# Patient Record
Sex: MALE | Race: WHITE
[De-identification: names, ages, dates, MRNs, and addresses within clinical notes are randomized per-mention and may not be internally consistent; named-entity substitution may affect disease eponyms.]

---

## 2020-03-24 ENCOUNTER — HOSPITAL ENCOUNTER (EMERGENCY)
Dept: HOSPITAL 41 - JD.ED | Age: 45
Discharge: HOME | End: 2020-03-24
Payer: COMMERCIAL
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
## 2020-03-24 DIAGNOSIS — S61.412A: Primary | ICD-10-CM
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
## 2020-03-24 DIAGNOSIS — Z91.030: ICD-10-CM

## 2020-03-24 PROCEDURE — 96375 TX/PRO/DX INJ NEW DRUG ADDON: CPT

## 2020-03-24 PROCEDURE — 73130 X-RAY EXAM OF HAND: CPT

## 2020-03-24 PROCEDURE — 99283 EMERGENCY DEPT VISIT LOW MDM: CPT

## 2020-03-24 PROCEDURE — 12002 RPR S/N/AX/GEN/TRNK2.6-7.5CM: CPT

## 2020-03-24 PROCEDURE — 96376 TX/PRO/DX INJ SAME DRUG ADON: CPT

## 2020-03-24 PROCEDURE — 96365 THER/PROPH/DIAG IV INF INIT: CPT

## 2020-03-24 NOTE — CR
Left hand: 4 views of the left hand were obtained.

 

Comparison: No previous hand study.

 

Study slightly limited due to finger flexion.

 

Joint spaces are preserved.  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  No acute fracture or 

other acute abnormality is appreciated.

 

Impression:

1.  Findings as noted above. 

2.  No definite acute bony abnormality is appreciated on this somewhat

 limited study.

 

Diagnostic code #2

 

This report was dictated in MDT

## 2020-03-24 NOTE — EDM.PDOC
ED HPI GENERAL MEDICAL PROBLEM





- General


Chief Complaint: Laceration


Stated Complaint: LEFT PALM LACERATION


Time Seen by Provider: 03/24/20 10:15


Source of Information: Reports: Patient, RN Notes Reviewed





- History of Present Illness


INITIAL COMMENTS - FREE TEXT/NARRATIVE: 





44-year-old male suffered laceration injury to left hand not too long ago.  He 

was working with a piece of equipment at a [employer] shop in [place],  some how his 

hand got "crushed"  in the mechanics of the machine with resultant laceration 

to palmar aspect left hand.  He states there was a lot of bleeding that has 

been controlled with pressure. Has had tetanus immunization within the past 

year.  No apparent numbness, tingling or focal weakness of distal fingers.


  ** Left Hand


Pain Score (Numeric/FACES): 10





- Related Data


 Allergies











Allergy/AdvReac Type Severity Reaction Status Date / Time


 


bee venom protein (honey bee) Allergy  Swelling Verified 03/24/20 10:01











Home Meds: 


 Home Meds





Acetaminophen/HYDROcodone [Norco 325-5 MG] 1 tab PO Q6H PRN #14 tablet 03/24/20 

[Rx]


Cephalexin [Keflex] 500 mg PO QID #20 capsule 03/24/20 [Rx]











Past Medical History





- Past Health History


Medical/Surgical History: Denies Medical/Surgical History





Social & Family History





- Tobacco Use


Smoking Status *Q: Never Smoker





- Caffeine Use


Caffeine Use: Reports: Coffee





- Recreational Drug Use


Recreational Drug Use: No





ED ROS GENERAL





- Review of Systems


Review Of Systems: See Below


Constitutional: Reports: No Symptoms


HEENT: Reports: No Symptoms


Respiratory: Reports: No Symptoms


Cardiovascular: Reports: No Symptoms


GI/Abdominal: Reports: No Symptoms


Musculoskeletal: Reports: Other (Large deep laceration palmar aspect left hand)


Neurological: Denies: Numbness, Tingling, Weakness





ED EXAM, SKIN/RASH


Exam: See Below


General Appearance: Alert, Anxious, Moderate Distress


Head: Atraumatic


Neck: Supple


Respiratory/Chest: No Respiratory Distress


Extremities: Normal Range of Motion (Patient is holding fingers somewhat in 

flexion because that is his most comfortable position.  Can extend and he can 

flex all fingers), Other (Centimeter laceration mid palmar aspect of left hand 

on the ulnar side, moderately deep, no foreign material visible,  no deep 

tendon structures visible)


Neurological: No Motor/Sensory Deficits (There is no focal weakness of any of 

the fingers and distal sensation to touch is intact all fingers)





ED SKIN PROCEDURES





- Laceration/Wound Repair


  ** Left Hand


Appearance: Linear, Irregular, Other (Oertli deep, no foreign material seen)


Distal NVT: Neuro & Vascular Intact


Anesthetic Type: Local


Local Anesthesia - Lidocaine (Xylocaine): 1% Plain


Exploration/Debridement/Repair: Wound Explored, Minimal Debridement


Lac/Wound length In cm: 6


Suture Size: 3-0


# of Sutures: 16


Suture Type: Nylon





Course





- Vital Signs


Last Recorded V/S: 


 Last Vital Signs











Temp  97.3 F   03/24/20 09:57


 


Pulse  69   03/24/20 09:57


 


Resp  16   03/24/20 09:57


 


BP  158/100 H  03/24/20 09:57


 


Pulse Ox  96   03/24/20 09:57














- Orders/Labs/Meds


Orders: 


 Active Orders 24 hr











 Category Date Time Status


 


 Peripheral IV Care [RC] .AS DIRECTED Care  03/24/20 10:13 Active


 


 Peripheral IV Insertion Adult [OM.PC] Stat Oth  03/24/20 10:12 Ordered











Meds: 


Medications














Discontinued Medications














Generic Name Dose Route Start Last Admin





  Trade Name Freq  PRN Reason Stop Dose Admin


 


Hydromorphone HCl  0.5 mg  03/24/20 10:12  03/24/20 10:22





  Dilaudid  IVPUSH  03/24/20 10:13  0.5 mg





  ONETIME ONE   Administration





     





     





     





     


 


Hydromorphone HCl  0.5 mg  03/24/20 11:16  03/24/20 11:22





  Dilaudid  IVPUSH  03/24/20 11:17  0.5 mg





  ONETIME ONE   Administration





     





     





     





     


 


Hydromorphone HCl  0.5 mg  03/24/20 11:45  03/24/20 11:50





  Dilaudid  IVPUSH  03/24/20 11:46  0.5 mg





  ONETIME ONE   Administration





     





     





     





     


 


Cefazolin Sodium/Dextrose 2 gm  50 mls @ 100 mls/hr  03/24/20 11:55  03/24/20 12

:01





  / Premix  IV  03/24/20 12:24  100 mls/hr





  ONETIME ONE   Administration





     





     





     





     


 


Lidocaine HCl  50 ml  03/24/20 11:16  03/24/20 11:22





  Xylocaine 1%  INJECT  03/24/20 11:17  50 ml





  ONETIME ONE   Administration





     





     





     





     


 


Lorazepam  0.5 mg  03/24/20 11:45  03/24/20 11:50





  Ativan  IVPUSH  03/24/20 11:46  0.5 mg





  ONETIME ONE   Administration





     





     





     





     


 


Ondansetron HCl  4 mg  03/24/20 10:12  03/24/20 10:22





  Zofran  IVPUSH  03/24/20 10:13  4 mg





  ONETIME ONE   Administration





     





     





     





     


 


Sodium Chloride  10 ml  03/24/20 10:12  03/24/20 10:29





  Saline Flush  FLUSH   10 ml





  ASDIRECTED PRN   Administration





  Keep Vein Open   





     





     





     














- Re-Assessments/Exams


Free Text/Narrative Re-Assessment/Exam: 





03/24/20 14:05


X-rays of hand, no acute fracture he was given Ancef 2 g IV while here in the ED

, he was also given Dilaudid and Ativan IV.  Patient tolerated procedure 

relatively well.  Discharge instr. as documented. 





Departure





- Departure


Time of Disposition: 12:30


Disposition: Home, Self-Care 01


Condition: Fair


Clinical Impression: 


Laceration of hand


Qualifiers:


 Encounter type: initial encounter Foreign body presence: without foreign body 

Laterality: left Qualified Code(s): S61.412A - Laceration without foreign body 

of left hand, initial encounter








- Discharge Information


Prescriptions: 


Acetaminophen/HYDROcodone [Norco 325-5 MG] 1 tab PO Q6H PRN #14 tablet


 PRN Reason: Pain


Cephalexin [Keflex] 500 mg PO QID #20 capsule


Instructions:  Laceration Care, Adult


Referrals: 


PCP,None [Primary Care Provider] - 


Forms:  ED Department Discharge, ED Return to Work/School Form


Additional Instructions: 


laceration care instructions.  Leave protective dressing on hand until your 

medical recheck on Thursday.  Keep dressing dry and clean.  Elevate hand as 

much as possible.


I will every 6 to 8 hours for mild to moderate pain or hydrocodone if needed 

for more severe gaye codon at the same time.  Do not drive when taking 

hydrocodone.  Cephalexin antibiotic 500 mg 4 times daily.  Have this rechecked 

at our [facility] medical clinic Thursday 2 days from now.   Call [phone] for 

appointment.  If for some reason unable to be seen at our [facility] medical clinic 

return to our ED for recheck. 





Sepsis Event Note





- Evaluation


Sepsis Screening Result: No Definite Risk





- Focused Exam


Vital Signs: 


 Vital Signs











  Temp Pulse Resp BP Pulse Ox


 


 03/24/20 09:57  97.3 F  69  16  158/100 H  96











Date Exam was Performed: 03/24/20


Time Exam was Performed: 13:58





- My Orders


Last 24 Hours: 


My Active Orders





03/24/20 10:12


Peripheral IV Insertion Adult [OM.PC] Stat 





03/24/20 10:13


Peripheral IV Care [RC] .AS DIRECTED 














- Assessment/Plan


Last 24 Hours: 


My Active Orders





03/24/20 10:12


Peripheral IV Insertion Adult [OM.PC] Stat 





03/24/20 10:13


Peripheral IV Care [RC] .AS DIRECTED

## 2020-04-01 ENCOUNTER — HOSPITAL ENCOUNTER (EMERGENCY)
Dept: HOSPITAL 41 - JD.ED | Age: 45
Discharge: HOME | End: 2020-04-01
Payer: COMMERCIAL
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
## 2020-04-01 DIAGNOSIS — S61.412A: ICD-10-CM

## 2020-04-01 DIAGNOSIS — S67.22XA: Primary | ICD-10-CM

## 2020-04-01 NOTE — EDM.PDOC
ED HPI GENERAL MEDICAL PROBLEM





- General


Chief Complaint: Upper Extremity Injury/Pain


Stated Complaint: NUMBNESS IN LEFT HAND POST INJURY


Time Seen by Provider: 04/01/20 10:29





- History of Present Illness


INITIAL COMMENTS - FREE TEXT/NARRATIVE: 





44-year-old male presents the emergency room with numbness in his left hand.





Patient was seen here last week with a laceration over the distal palmar crease 

of his left hand following a crush type injury that he did at work.  Since that 

time the patient has developed generalized numbness in his fingers especially 

the pinky ring and to a lesser degree the middle finger.  This seems to involve 

both the dorsum and flexor surfaces of the fingers.  The patient has been back 

to work and has been doing quite a bit of work with his hand.  At night he has 

a hard time keeping the hand in a comfortable position.  Patient is using 

ibuprofen for pain and discomfort and this does help some


  ** Left Hand


Pain Score (Numeric/FACES): 9





- Related Data


 Allergies











Allergy/AdvReac Type Severity Reaction Status Date / Time


 


bee venom protein (honey bee) Allergy  Swelling Verified 04/01/20 10:34











Home Meds: 


 Home Meds





Acetaminophen/HYDROcodone [Norco 325-5 MG] 1 tab PO Q6H PRN #14 tablet 03/24/20 

[Rx]


Cephalexin [Keflex] 500 mg PO QID #20 capsule 03/24/20 [Rx]











Past Medical History





- Past Health History


Medical/Surgical History: Denies Medical/Surgical History





Social & Family History





- Caffeine Use


Caffeine Use: Reports: Coffee





Review of Systems





- Review of Systems


Review Of Systems: See Below


Constitutional: Reports: No Symptoms


Respiratory: Reports: No Symptoms


Cardiovascular: Reports: No Symptoms


GI/Abdominal: Reports: No Symptoms





ED EXAM, GENERAL





- Physical Exam


Exam: See Below


Exam Limited By: No Limitations


General Appearance: Alert, No Apparent Distress


Respiratory/Chest: No Respiratory Distress, Lungs Clear, Normal Breath Sounds


Cardiovascular: Regular Rate, Rhythm, No Edema, No Murmur


Extremities: Other (Patient is left hand shows intact vascular status good 

capillary refill.  He has circumferential numbness involving the pinky ring and 

to a lesser degree the middle finger.  Also of concern is him having a hard 

time fully assessing his flexion and extension probably due to the swelling in 

his has some ecchymosis developing over the dorsum of his hand with noticeable 

swelling over the dorsum of his hand as the palmar aspect which is more 

noticeable than the dorsal aspect.)





Course





- Vital Signs


Last Recorded V/S: 


 Last Vital Signs











Temp  36.8 C   04/01/20 10:28


 


Pulse  90   04/01/20 10:28


 


Resp  16   04/01/20 10:28


 


BP  152/100 H  04/01/20 10:28


 


Pulse Ox  96   04/01/20 10:28














- Re-Assessments/Exams


Free Text/Narrative Re-Assessment/Exam: 





04/01/20 11:29


This unfortunate gentleman has what I believe is neuropraxia because of the 

crush injuries involving the nerves in his hand and the resultant swelling on 

both the dorsum and palmar surfaces of his hand.  Vascularly he is intact.  I 

have some concerns from reviewing his initial note immediately after the injury 

neurovascularly he was intact he had limited tendon function he could flex and 

extend his digits but this cannot be done fully thought to be secondary to the 

injury.  At this time I cannot get him to fully extend in part due to the 

swelling he has and he cannot fully flex.  I have informed the patient in 

absolutely no uncertain terms that this needs to be followed very closely until 

he has normal function established.  Also he is at work and his employer is 

pushing him to do more with his left hand that he should be doing at this point 

with the swelling and there he needs to be doing limited duty with this hand.  

This will be included in his discharge instructions I believe in his best 

interest he should follow-up with the L&I doctor early next week.  He is using 

ibuprofen he is advised to continue with this as well.











Departure





- Departure


Time of Disposition: 11:32


Disposition: Home, Self-Care 01


Clinical Impression: 


 Crushing injury of left hand





Laceration of hand


Qualifiers:


 Encounter type: initial encounter Foreign body presence: without foreign body 

Laterality: left Qualified Code(s): S61.412A - Laceration without foreign body 

of left hand, initial encounter








- Discharge Information


Instructions:  Laceration Care, Adult


Referrals: 


PCP,None [Primary Care Provider] - 


Forms:  ED Department Discharge, ED Return to Work/School Form


Additional Instructions: 


Return to the emergency room with any questions problems or worsening symptoms.

  





Keep your hand elevated as much as you can.  I cannot emphasize this enough.  





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  Watch for return 

of nerve function.  Also your flexion and extension, the ability to move your 

fingers fully needs to be monitored until full function is established.





Sepsis Event Note





- Focused Exam


Vital Signs: 


 Vital Signs











  Temp Pulse Resp BP Pulse Ox


 


 04/01/20 10:28  36.8 C  90  16  152/100 H  96











Date Exam was Performed: 04/01/20


Time Exam was Performed: 11:51

## 2020-04-06 ENCOUNTER — HOSPITAL ENCOUNTER (EMERGENCY)
Dept: HOSPITAL 41 - JD.ED | Age: 45
End: 2020-04-06
Payer: COMMERCIAL

## 2020-04-12 ENCOUNTER — HOSPITAL ENCOUNTER (EMERGENCY)
Dept: HOSPITAL 41 - JD.ED | Age: 45
Discharge: HOME | End: 2020-04-12
Payer: COMMERCIAL

## 2020-04-12 DIAGNOSIS — Z91.030: ICD-10-CM
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
## 2020-04-12 DIAGNOSIS — F17.210: ICD-10-CM

## 2020-04-12 DIAGNOSIS — L08.9: ICD-10-CM

## 2020-04-12 DIAGNOSIS — S61.412A: Primary | ICD-10-CM

## 2020-04-12 NOTE — EDM.PDOC
ED HPI GENERAL MEDICAL PROBLEM





- General


Chief Complaint: Upper Extremity Injury/Pain


Stated Complaint: LEFT HAND PAIN (ODOR FROM INJURY)


Time Seen by Provider: 04/12/20 14:26


Source of Information: Reports: Patient


History Limitations: Reports: No Limitations





- History of Present Illness


Onset: Gradual


Duration: Day(s):, Getting Worse


Location: Reports: Lower Extremity, Left


Quality: Reports: Ache, Sharp, Throbbing


Severity: Moderate


Improves with: Reports: Cold Therapy


Worsens with: Reports: None


Associated Symptoms: Denies: Fever/Chills, Headaches, Loss of Appetite, Nausea/

Vomiting, Shortness of Breath


Treatments PTA: Reports: NSAIDS (Patient presents with increasing left hand 

pain with smelly discharge and bleeding.  He initially injured his hand on 

March 24 while working at a [employer] store in [place].  Right hand dominant, left 

hand injury got pulled into the [employer] machine and sustained a laceration injury 

to the left palm distal palm near the distal areas of the palm near the third 

fourth and fifth digits.  He has continually been unable to straighten his hand 

or flex it.  Concerned that there might be infection he initially was on 

antibiotics.  He was seen and evaluated by orthopedist here but told to follow-

up again in a couple weeks.  Patient is concerned that there might be 

infection.  No red streaking he does have some tenderness in the wrist and 

forearm, no red streaks no fevers chills or sweats, does have some numbness and 

tingling to the fingers of the left hand.)


  ** Left Hand


Pain Score (Numeric/FACES): 9





- Related Data


 Allergies











Allergy/AdvReac Type Severity Reaction Status Date / Time


 


bee venom protein (honey bee) Allergy  Swelling Verified 04/01/20 10:34











Home Meds: 


 Home Meds





Acetaminophen/HYDROcodone [Norco 325-5 MG] 1 tab PO Q6H PRN #14 tablet 03/24/20 

[Rx]


Cephalexin [Keflex] 500 mg PO QID #20 capsule 03/24/20 [Rx]


Naproxen [Naprosyn] 500 mg PO Q12HR #20 tab 04/12/20 [Rx]


cephALEXin [Keflex] 500 mg PO Q8H #30 cap 04/12/20 [Rx]











Past Medical History





- Past Health History


Medical/Surgical History: Denies Medical/Surgical History





- Infectious Disease History


Infectious Disease History: Reports: Chicken Pox





Social & Family History





- Family History


Family Medical History: Noncontributory





- Tobacco Use


Smoking Status *Q: Current Every Day Smoker


Years of Tobacco use: 6


Packs/Tins Daily: 0.5





- Caffeine Use


Caffeine Use: Reports: Coffee





Review of Systems





- Review of Systems


Review Of Systems: See Below


Constitutional: Denies: Chills, Fever


Mouth/Throat: Denies: Painful Swallowing


Respiratory: Denies: Shortness of Breath, Cough


Cardiovascular: Denies: Chest Pain


GI/Abdominal: Denies: Abdominal Pain


Musculoskeletal: Reports: Hand Pain


Skin: Reports: No Symptoms


Neurological: Reports: Paresthesia


Psychiatric: Reports: No Symptoms (No red streaks or increasing swelling)





ED EXAM, GENERAL





- Physical Exam


Exam: See Below


Exam Limited By: No Limitations


General Appearance: Alert, WD/WN


Respiratory/Chest: No Respiratory Distress, Lungs Clear


Cardiovascular: Normal Peripheral Pulses, Regular Rate, Rhythm


Extremities: Normal Capillary Refill, Limited Range of Motion.  No: Normal 

Range of Motion


Neurological: Alert, Sensory/Motor Deficit


Psychiatric: Normal Affect (Palm of the left hand shows open laceration is 

still present to the distal palm, unable to really straighten his hand or 

fingers or flex them on his own.  Distal cap refill and sensation are normal 

however he does have paresthesias to the fingertips of the third and fourth and 

fifth fingers.)


Skin Exam: Warm (No palm are swelling or sausage fingers, he does have 

tenderness to the flexor areas on the wrist and distal forearm.  No red 

streaking or adenopathy noted.)





Course





- Vital Signs


Last Recorded V/S: 


 Last Vital Signs











Temp  97.7 F   04/12/20 14:18


 


Pulse  77   04/12/20 14:18


 


Resp  16   04/12/20 14:18


 


BP  154/92 H  04/12/20 14:18


 


Pulse Ox  98   04/12/20 14:18














- Re-Assessments/Exams


Free Text/Narrative Re-Assessment/Exam: 





04/12/20 15:01


Concerning for infection, suspect he may have a tendon injury as well.  Rule 

out very early tenosynovitis.  We will get him to the hand surgeon tomorrow.  

Will call [facility] to see if and get him in soon as tomorrow.  We will 

start him on Keflex 500 mg 3 times a day and Naprosyn.  Ace wrap keep clean, 

may use topical antibiotics.  Patient would like to hold off on any narcotic 

medications.  Patient right-handed.  Reviewed return precautions.


Free Text/Narrative Re-Assessment/Exam: 





04/12/20 15:06


Discused case with Dr. Kevin Aden on-call for orthopedist at [facility] 

[facility] refer to [facility] hand specialist for tomorrow





Departure





- Departure


Time of Disposition: 15:27


Disposition: Home, Self-Care 01


Condition: Fair


Clinical Impression: 


 Infected hand, Tendon injury, late effect





Laceration of hand


Qualifiers:


 Encounter type: initial encounter Foreign body presence: without foreign body 

Laterality: left Qualified Code(s): S61.412A - Laceration without foreign body 

of left hand, initial encounter








- Discharge Information


Prescriptions: 


Naproxen [Naprosyn] 500 mg PO Q12HR #20 tab


cephALEXin [Keflex] 500 mg PO Q8H #30 cap


Instructions:  Wound Infection, Easy-to-Read


Referrals: 


PCP,None [Primary Care Provider] - 1 Day


(Dr. Kevin Aden


[facility]


[address] [place] [address])


Forms:  ED Department Discharge


Additional Instructions: 


Follow-up at [facility] in the morning at age 43 with Dr. Kevin Aden.  

Avoid any food or fluids after midnight.  Antibiotics as directed.





Sepsis Event Note





- Evaluation


Sepsis Screening Result: No Definite Risk





- Focused Exam


Vital Signs: 


 Vital Signs











  Temp Pulse Resp BP Pulse Ox


 


 04/12/20 14:18  97.7 F  77  16  154/92 H  98











Date Exam was Performed: 04/12/20


Time Exam was Performed: 15:06